# Patient Record
Sex: FEMALE | Race: BLACK OR AFRICAN AMERICAN | NOT HISPANIC OR LATINO | URBAN - METROPOLITAN AREA
[De-identification: names, ages, dates, MRNs, and addresses within clinical notes are randomized per-mention and may not be internally consistent; named-entity substitution may affect disease eponyms.]

---

## 2017-12-02 ENCOUNTER — EMERGENCY (EMERGENCY)
Facility: HOSPITAL | Age: 22
LOS: 1 days | Discharge: HOME | End: 2017-12-02
Attending: EMERGENCY MEDICINE | Admitting: EMERGENCY MEDICINE
Payer: COMMERCIAL

## 2017-12-02 VITALS
HEART RATE: 89 BPM | SYSTOLIC BLOOD PRESSURE: 116 MMHG | DIASTOLIC BLOOD PRESSURE: 78 MMHG | OXYGEN SATURATION: 99 % | RESPIRATION RATE: 18 BRPM | TEMPERATURE: 98 F

## 2017-12-02 VITALS
RESPIRATION RATE: 18 BRPM | SYSTOLIC BLOOD PRESSURE: 108 MMHG | DIASTOLIC BLOOD PRESSURE: 78 MMHG | WEIGHT: 104.94 LBS | OXYGEN SATURATION: 97 % | HEART RATE: 128 BPM | TEMPERATURE: 99 F

## 2017-12-02 LAB
ALBUMIN SERPL ELPH-MCNC: 4.1 G/DL — SIGNIFICANT CHANGE UP (ref 3.3–5)
ALP SERPL-CCNC: 37 U/L — LOW (ref 40–120)
ALT FLD-CCNC: 9 U/L — LOW (ref 10–45)
ANION GAP SERPL CALC-SCNC: 15 MMOL/L — SIGNIFICANT CHANGE UP (ref 5–17)
APPEARANCE UR: (no result)
AST SERPL-CCNC: 16 U/L — SIGNIFICANT CHANGE UP (ref 10–40)
BASOPHILS NFR BLD AUTO: 0.4 % — SIGNIFICANT CHANGE UP (ref 0–2)
BILIRUB SERPL-MCNC: 0.5 MG/DL — SIGNIFICANT CHANGE UP (ref 0.2–1.2)
BILIRUB UR-MCNC: NEGATIVE — SIGNIFICANT CHANGE UP
BUN SERPL-MCNC: 7 MG/DL — SIGNIFICANT CHANGE UP (ref 7–23)
CALCIUM SERPL-MCNC: 11.4 MG/DL — HIGH (ref 8.4–10.5)
CHLORIDE SERPL-SCNC: 97 MMOL/L — SIGNIFICANT CHANGE UP (ref 96–108)
CO2 SERPL-SCNC: 22 MMOL/L — SIGNIFICANT CHANGE UP (ref 22–31)
COLOR SPEC: YELLOW — SIGNIFICANT CHANGE UP
CREAT SERPL-MCNC: 0.65 MG/DL — SIGNIFICANT CHANGE UP (ref 0.5–1.3)
DIFF PNL FLD: (no result)
EOSINOPHIL NFR BLD AUTO: 1.3 % — SIGNIFICANT CHANGE UP (ref 0–6)
GLUCOSE SERPL-MCNC: 81 MG/DL — SIGNIFICANT CHANGE UP (ref 70–99)
GLUCOSE UR QL: NEGATIVE — SIGNIFICANT CHANGE UP
HCG SERPL-ACNC: HIGH MIU/ML
HCT VFR BLD CALC: 43 % — SIGNIFICANT CHANGE UP (ref 34.5–45)
HGB BLD-MCNC: 14.7 G/DL — SIGNIFICANT CHANGE UP (ref 11.5–15.5)
KETONES UR-MCNC: 40 MG/DL
LEUKOCYTE ESTERASE UR-ACNC: NEGATIVE — SIGNIFICANT CHANGE UP
LYMPHOCYTES # BLD AUTO: 23.6 % — SIGNIFICANT CHANGE UP (ref 13–44)
MCHC RBC-ENTMCNC: 29.5 PG — SIGNIFICANT CHANGE UP (ref 27–34)
MCHC RBC-ENTMCNC: 34.2 G/DL — SIGNIFICANT CHANGE UP (ref 32–36)
MCV RBC AUTO: 86.2 FL — SIGNIFICANT CHANGE UP (ref 80–100)
MONOCYTES NFR BLD AUTO: 9.1 % — SIGNIFICANT CHANGE UP (ref 2–14)
NEUTROPHILS NFR BLD AUTO: 65.6 % — SIGNIFICANT CHANGE UP (ref 43–77)
NITRITE UR-MCNC: NEGATIVE — SIGNIFICANT CHANGE UP
PH UR: 6.5 — SIGNIFICANT CHANGE UP (ref 5–8)
PLATELET # BLD AUTO: 203 K/UL — SIGNIFICANT CHANGE UP (ref 150–400)
POTASSIUM SERPL-MCNC: 3.8 MMOL/L — SIGNIFICANT CHANGE UP (ref 3.5–5.3)
POTASSIUM SERPL-SCNC: 3.8 MMOL/L — SIGNIFICANT CHANGE UP (ref 3.5–5.3)
PROT SERPL-MCNC: 7.4 G/DL — SIGNIFICANT CHANGE UP (ref 6–8.3)
PROT UR-MCNC: NEGATIVE MG/DL — SIGNIFICANT CHANGE UP
RBC # BLD: 4.99 M/UL — SIGNIFICANT CHANGE UP (ref 3.8–5.2)
RBC # FLD: 12.9 % — SIGNIFICANT CHANGE UP (ref 10.3–16.9)
RH IG SCN BLD-IMP: NEGATIVE — SIGNIFICANT CHANGE UP
SODIUM SERPL-SCNC: 134 MMOL/L — LOW (ref 135–145)
SP GR SPEC: 1.02 — SIGNIFICANT CHANGE UP (ref 1–1.03)
UROBILINOGEN FLD QL: 0.2 E.U./DL — SIGNIFICANT CHANGE UP
WBC # BLD: 5.6 K/UL — SIGNIFICANT CHANGE UP (ref 3.8–10.5)
WBC # FLD AUTO: 5.6 K/UL — SIGNIFICANT CHANGE UP (ref 3.8–10.5)

## 2017-12-02 PROCEDURE — 76830 TRANSVAGINAL US NON-OB: CPT

## 2017-12-02 PROCEDURE — 96372 THER/PROPH/DIAG INJ SC/IM: CPT

## 2017-12-02 PROCEDURE — 36415 COLL VENOUS BLD VENIPUNCTURE: CPT

## 2017-12-02 PROCEDURE — 87086 URINE CULTURE/COLONY COUNT: CPT

## 2017-12-02 PROCEDURE — 81001 URINALYSIS AUTO W/SCOPE: CPT

## 2017-12-02 PROCEDURE — 80053 COMPREHEN METABOLIC PANEL: CPT

## 2017-12-02 PROCEDURE — 86901 BLOOD TYPING SEROLOGIC RH(D): CPT

## 2017-12-02 PROCEDURE — 99285 EMERGENCY DEPT VISIT HI MDM: CPT

## 2017-12-02 PROCEDURE — 85025 COMPLETE CBC W/AUTO DIFF WBC: CPT

## 2017-12-02 PROCEDURE — 76801 OB US < 14 WKS SINGLE FETUS: CPT

## 2017-12-02 PROCEDURE — 86900 BLOOD TYPING SEROLOGIC ABO: CPT

## 2017-12-02 PROCEDURE — 99284 EMERGENCY DEPT VISIT MOD MDM: CPT | Mod: 25

## 2017-12-02 PROCEDURE — 86850 RBC ANTIBODY SCREEN: CPT

## 2017-12-02 PROCEDURE — 76817 TRANSVAGINAL US OBSTETRIC: CPT | Mod: 26

## 2017-12-02 PROCEDURE — 76815 OB US LIMITED FETUS(S): CPT | Mod: 26

## 2017-12-02 PROCEDURE — 84702 CHORIONIC GONADOTROPIN TEST: CPT

## 2017-12-02 RX ORDER — METOCLOPRAMIDE HCL 10 MG
1 TABLET ORAL
Qty: 42 | Refills: 0
Start: 2017-12-02 | End: 2017-12-16

## 2017-12-02 RX ORDER — RHO(D) IMMUNE GLOBULIN INTRAVENOUS (HUMAN) 2500 [IU]/1
300 INJECTION INTRAMUSCULAR; INTRAVENOUS ONCE
Qty: 0 | Refills: 0 | Status: DISCONTINUED | OUTPATIENT
Start: 2017-12-02 | End: 2017-12-02

## 2017-12-02 NOTE — ED ADULT NURSE NOTE - OBJECTIVE STATEMENT
Pt w no PMH and not on any daily meds who is  BIBA to ED with c/o vag spotting associated w abdom cramping, she denies any clots. Pt w no PMH and not on any daily meds who is  BIBA to ED with c/o vag spotting since Wednesday associated w abdom cramping, she denies any clots.

## 2017-12-02 NOTE — ED PROVIDER NOTE - CONDUCTED A DETAILED DISCUSSION WITH PATIENT AND/OR GUARDIAN REGARDING, MDM
lab results return to ED if symptoms worsen, persist or questions arise/lab results/radiology results/need for outpatient follow-up

## 2017-12-02 NOTE — ED PROVIDER NOTE - OBJECTIVE STATEMENT
21 y/o f with h/o 12 weeks in gestation presents to ED c/o vaginal bleeding since wednesday worsen yesterday. She state of test positive for pregnancy at 4 weeks has not had a sonogram to confirm IUP. Report of some dysuria yesterday, frequency  and continue to see blood whenever she urinated. + cramping. Denies fever, n,v, sob, chest pain, flank pain. LMP 17 ,

## 2017-12-02 NOTE — ED PROVIDER NOTE - ATTENDING CONTRIBUTION TO CARE
23 yo female  currently 12 weeks in gestation w/o prior u/s c/o 3 d intermittent vaginal bleeding and pelvic cramping w/o cp, palpitations, sob, n/v/d.   + mild dysuria.  LMP 17.  Well appearing, nad, nc/at, lung cta, heart reg, abd soft/nt, pelvic per pa exam, ext no c/c/e, no gross neuro deficits.  Doubt ectopic since 12 wk but ? threatened ab.  Plan labs, u/s, ua, reassess.  Likely dc to [facility] ob

## 2017-12-02 NOTE — ED PROVIDER NOTE - MEDICAL DECISION MAKING DETAILS
Patient with + IUP with vaginal bleeding. UA neg , RH- tx with rhogam. Sonogram + FH in the 160's. Recommend f/u with OB and pelvic rest.

## 2017-12-03 LAB
CULTURE RESULTS: SIGNIFICANT CHANGE UP
SPECIMEN SOURCE: SIGNIFICANT CHANGE UP

## 2017-12-06 DIAGNOSIS — O20.9 HEMORRHAGE IN EARLY PREGNANCY, UNSPECIFIED: ICD-10-CM

## 2017-12-06 DIAGNOSIS — Z3A.12 12 WEEKS GESTATION OF PREGNANCY: ICD-10-CM

## 2019-07-14 ENCOUNTER — EMERGENCY (EMERGENCY)
Facility: HOSPITAL | Age: 24
LOS: 1 days | Discharge: HOME | End: 2019-07-14
Attending: EMERGENCY MEDICINE
Payer: SELF-PAY

## 2019-07-14 VITALS
TEMPERATURE: 99 F | HEIGHT: 62 IN | DIASTOLIC BLOOD PRESSURE: 79 MMHG | OXYGEN SATURATION: 98 % | RESPIRATION RATE: 17 BRPM | SYSTOLIC BLOOD PRESSURE: 115 MMHG | HEART RATE: 86 BPM | WEIGHT: 132.06 LBS

## 2019-07-14 VITALS
OXYGEN SATURATION: 98 % | RESPIRATION RATE: 17 BRPM | SYSTOLIC BLOOD PRESSURE: 110 MMHG | DIASTOLIC BLOOD PRESSURE: 88 MMHG | TEMPERATURE: 98 F | HEART RATE: 70 BPM

## 2019-07-14 LAB
ALBUMIN SERPL ELPH-MCNC: 4.1 G/DL — SIGNIFICANT CHANGE UP (ref 3.5–5)
ALP SERPL-CCNC: 72 U/L — SIGNIFICANT CHANGE UP (ref 40–120)
ALT FLD-CCNC: 17 U/L DA — SIGNIFICANT CHANGE UP (ref 10–60)
ANION GAP SERPL CALC-SCNC: 7 MMOL/L — SIGNIFICANT CHANGE UP (ref 5–17)
AST SERPL-CCNC: 17 U/L — SIGNIFICANT CHANGE UP (ref 10–40)
BASOPHILS # BLD AUTO: 0.02 K/UL — SIGNIFICANT CHANGE UP (ref 0–0.2)
BASOPHILS NFR BLD AUTO: 0.4 % — SIGNIFICANT CHANGE UP (ref 0–2)
BILIRUB SERPL-MCNC: 0.8 MG/DL — SIGNIFICANT CHANGE UP (ref 0.2–1.2)
BLD GP AB SCN SERPL QL: SIGNIFICANT CHANGE UP
BUN SERPL-MCNC: 10 MG/DL — SIGNIFICANT CHANGE UP (ref 7–18)
CALCIUM SERPL-MCNC: 8.7 MG/DL — SIGNIFICANT CHANGE UP (ref 8.4–10.5)
CHLORIDE SERPL-SCNC: 105 MMOL/L — SIGNIFICANT CHANGE UP (ref 96–108)
CO2 SERPL-SCNC: 25 MMOL/L — SIGNIFICANT CHANGE UP (ref 22–31)
CREAT SERPL-MCNC: 0.82 MG/DL — SIGNIFICANT CHANGE UP (ref 0.5–1.3)
EOSINOPHIL # BLD AUTO: 0.1 K/UL — SIGNIFICANT CHANGE UP (ref 0–0.5)
EOSINOPHIL NFR BLD AUTO: 2.1 % — SIGNIFICANT CHANGE UP (ref 0–6)
GLUCOSE SERPL-MCNC: 77 MG/DL — SIGNIFICANT CHANGE UP (ref 70–99)
HCT VFR BLD CALC: 43.3 % — SIGNIFICANT CHANGE UP (ref 34.5–45)
HGB BLD-MCNC: 14.2 G/DL — SIGNIFICANT CHANGE UP (ref 11.5–15.5)
IMM GRANULOCYTES NFR BLD AUTO: 0.4 % — SIGNIFICANT CHANGE UP (ref 0–1.5)
LYMPHOCYTES # BLD AUTO: 1.13 K/UL — SIGNIFICANT CHANGE UP (ref 1–3.3)
LYMPHOCYTES # BLD AUTO: 23.6 % — SIGNIFICANT CHANGE UP (ref 13–44)
MCHC RBC-ENTMCNC: 29.8 PG — SIGNIFICANT CHANGE UP (ref 27–34)
MCHC RBC-ENTMCNC: 32.8 GM/DL — SIGNIFICANT CHANGE UP (ref 32–36)
MCV RBC AUTO: 90.8 FL — SIGNIFICANT CHANGE UP (ref 80–100)
MONOCYTES # BLD AUTO: 0.57 K/UL — SIGNIFICANT CHANGE UP (ref 0–0.9)
MONOCYTES NFR BLD AUTO: 11.9 % — SIGNIFICANT CHANGE UP (ref 2–14)
NEUTROPHILS # BLD AUTO: 2.95 K/UL — SIGNIFICANT CHANGE UP (ref 1.8–7.4)
NEUTROPHILS NFR BLD AUTO: 61.6 % — SIGNIFICANT CHANGE UP (ref 43–77)
NRBC # BLD: 0 /100 WBCS — SIGNIFICANT CHANGE UP (ref 0–0)
PLATELET # BLD AUTO: 218 K/UL — SIGNIFICANT CHANGE UP (ref 150–400)
POTASSIUM SERPL-MCNC: 4.4 MMOL/L — SIGNIFICANT CHANGE UP (ref 3.5–5.3)
POTASSIUM SERPL-SCNC: 4.4 MMOL/L — SIGNIFICANT CHANGE UP (ref 3.5–5.3)
PROT SERPL-MCNC: 7.4 G/DL — SIGNIFICANT CHANGE UP (ref 6–8.3)
RBC # BLD: 4.77 M/UL — SIGNIFICANT CHANGE UP (ref 3.8–5.2)
RBC # FLD: 12.8 % — SIGNIFICANT CHANGE UP (ref 10.3–14.5)
SODIUM SERPL-SCNC: 137 MMOL/L — SIGNIFICANT CHANGE UP (ref 135–145)
WBC # BLD: 4.79 K/UL — SIGNIFICANT CHANGE UP (ref 3.8–10.5)
WBC # FLD AUTO: 4.79 K/UL — SIGNIFICANT CHANGE UP (ref 3.8–10.5)

## 2019-07-14 PROCEDURE — 99285 EMERGENCY DEPT VISIT HI MDM: CPT

## 2019-07-14 PROCEDURE — 86901 BLOOD TYPING SEROLOGIC RH(D): CPT

## 2019-07-14 PROCEDURE — 80053 COMPREHEN METABOLIC PANEL: CPT

## 2019-07-14 PROCEDURE — 76817 TRANSVAGINAL US OBSTETRIC: CPT

## 2019-07-14 PROCEDURE — 96372 THER/PROPH/DIAG INJ SC/IM: CPT

## 2019-07-14 PROCEDURE — 99284 EMERGENCY DEPT VISIT MOD MDM: CPT | Mod: 25

## 2019-07-14 PROCEDURE — 76817 TRANSVAGINAL US OBSTETRIC: CPT | Mod: 26

## 2019-07-14 PROCEDURE — 86900 BLOOD TYPING SEROLOGIC ABO: CPT

## 2019-07-14 PROCEDURE — 84702 CHORIONIC GONADOTROPIN TEST: CPT

## 2019-07-14 PROCEDURE — 36415 COLL VENOUS BLD VENIPUNCTURE: CPT

## 2019-07-14 PROCEDURE — 76801 OB US < 14 WKS SINGLE FETUS: CPT

## 2019-07-14 PROCEDURE — 86850 RBC ANTIBODY SCREEN: CPT

## 2019-07-14 PROCEDURE — 85027 COMPLETE CBC AUTOMATED: CPT

## 2019-07-14 PROCEDURE — 76801 OB US < 14 WKS SINGLE FETUS: CPT | Mod: 26

## 2019-07-14 RX ORDER — METHOTREXATE 2.5 MG/1
80 TABLET ORAL ONCE
Refills: 0 | Status: COMPLETED | OUTPATIENT
Start: 2019-07-14 | End: 2019-07-14

## 2019-07-14 RX ORDER — ACETAMINOPHEN WITH CODEINE 300MG-30MG
1 TABLET ORAL
Qty: 10 | Refills: 0
Start: 2019-07-14

## 2019-07-14 RX ORDER — ACETAMINOPHEN 500 MG
650 TABLET ORAL ONCE
Refills: 0 | Status: COMPLETED | OUTPATIENT
Start: 2019-07-14 | End: 2019-07-14

## 2019-07-14 RX ADMIN — Medication 650 MILLIGRAM(S): at 11:18

## 2019-07-14 RX ADMIN — Medication 650 MILLIGRAM(S): at 12:18

## 2019-07-14 RX ADMIN — METHOTREXATE 80 MILLIGRAM(S): 2.5 TABLET ORAL at 14:48

## 2019-07-14 NOTE — ED ADULT NURSE REASSESSMENT NOTE - NS ED NURSE REASSESS COMMENT FT1
Patient in no acute distress, no signs or symptoms of reaction to methotrexate . Patient tolerated injection well. No acute onset of symptoms verbalized.

## 2019-07-14 NOTE — CHART NOTE - NSCHARTNOTEFT_GEN_A_CORE
[facility] FOLLOW UP NOTE    Pt seen at [facility]. Pt explained that Methotrexate therapy is recommended given clinical findings and [facility] 594. Pt explained risks and benefits of methotrexate including failing therapy course and side effects of medication. Pt verbalized understanding and states she wishes to proceed with treatment. Pt given educational information on Methotrexate and informed consent was signed. Will order methotrexate injection at this time.   Pt to return to ER for repeat blood testing on Wednesday 7/17 for day 4 of methotrexate and later day 7   Return to ER for any acute compaints including abdominal pain, vaginal bleeding, dizziness or any other acute symptoms   case d/w Dr. Cat

## 2019-07-14 NOTE — ED PROVIDER NOTE - CLINICAL SUMMARY MEDICAL DECISION MAKING FREE TEXT BOX
Patient is 24y/o F who took positive home pregnancy test and is complaining of right side abdominal pain and vaginal bleeding. Concerned for ectopic pregnancy. Will do labs, sonogram and reassess.

## 2019-07-14 NOTE — ED ADULT TRIAGE NOTE - CHIEF COMPLAINT QUOTE
Rt abd pain radiating to Rt leg since yesterday, denies nausea or vomiting  c/o vaginal bleeding with clots, Home pregnancy test positive

## 2019-07-14 NOTE — ED PROVIDER NOTE - PROGRESS NOTE DETAILS
Pt seen by GYN team, ordered for methotrexate. To have repeat test on Wednesday. Pt educated on care and f/u.

## 2019-07-14 NOTE — ED ADULT NURSE NOTE - OBJECTIVE STATEMENT
Pt c/o Right abd pain radiating to Rt leg since yesterday, denies nausea or vomiting. Pt c/o vaginal bleeding with clots, Home pregnancy test positive since last week pt states. No acute distress noted, denies chest pain, no shortness of breath indicated. Safety maintained.

## 2019-07-14 NOTE — CONSULT NOTE ADULT - SUBJECTIVE AND OBJECTIVE BOX
23 year old  with unknown LMP presents to ED with complaint of RLQ since yesterday morning. Pt states she started having the pain since when she woke up yesterday morning and it has been a constant pain since then. Pt was given Tylenol in ED with some improvement. Pt states she had a positive urine pregnancy test last week although she has not had an OBGYN visit with this pregnancy. Pt denies vaginal ble 23 year old  with unknown LMP presents to ED with complaint of RLQ since yesterday morning. Pt states she started having the pain since when she woke up yesterday morning and it has been a constant pain since then. Pt was given Tylenol in ED with some improvement. Pt states she had a positive urine pregnancy test last week although she has not had an OBGYN visit with this pregnancy. Pt denies any vaginal bleeding, chest pain, sob, dizziness, palpitations, n/v/d, dysuria or frequency.     OBGYN: does not have   Pt states she had a positive urine pregnancy test last week although she has not had an OBGYN visit with this pregnancy.  Pt has history of irregular menses and has not had a period since getting off birth control in february   Hx of CS x 2, TOP w/ d&c x 2  denies ovarian cysts, denies fibroids, stds last pap wnl   Allergies: NKA  Meds: none   PMHx: denies   PSHx: CS x 2, d&c x 2   PsocHx: denies x 3     Vital Signs Last 24 Hrs  T(C): 37.1 (2019 08:20), Max: 37.1 (2019 08:20)  T(F): 98.7 (2019 08:20), Max: 98.7 (2019 08:20)  HR: 86 (2019 08:20) (86 - 86)  BP: 115/79 (2019 08:20) (115/79 - 115/79)  BP(mean): --  RR: 17 (2019 08:20) (17 - 17)  SpO2: 98% (2019 08:20) (98% - 98%)    PHYSICAL EXAM:    Gen: A&O x 3, NAD  Chest: CTA B/L  Cardiac: S1,S2  RRR  Abdomen: +BS; soft; Nontender, nondistended, no rebound or guarding   Gyn deferred   Extremities: Nontender, no edema    examined by Dr. Cat     LABS:                        14.2   4.79  )-----------( 218      ( 2019 09:18 )             43.3     07-14    137  |  105  |  10  ----------------------------<  77  4.4   |  25  |  0.82    Ca    8.7      2019 09:18    TPro  7.4  /  Alb  4.1  /  TBili  0.8  /  DBili  x   /  AST  17  /  ALT  17  /  AlkPhos  72  07-14    [facility] 594      RADIOLOGY & ADDITIONAL STUDIES:    < from: US Transvaginal, OB (19 @ 10:27) >  INTERPRETATION:  Transabdominal and transvaginal obstetrical ultrasound    Indication: Pregnantwoman with vaginal bleeding. Right pelvic pain.     Transabdominal and transvaginal obstetrical ultrasounds are performed and   they are reported together. No prior examination is available for   comparison. The uterus is anteverted at 11.4 x 4.4 x 5.4 cm. No evidence   for an intrauterine gestational sac. The endometrial stripe measures 1.0   cm in thickness which is within normal limits. No evidence for a uterine   fibroid. The cervix is closed.    The right ovary measures 2.8 x 2.0 x 2.7 cm and the left ovary measures   2.2 x 1.3 x 2.1 cm. Both ovaries appear unremarkable. Duplex Doppler flow   is demonstrated for both ovaries.    In the right adnexal region adjacent to the right ovary, there is a 2.3 x   2.2 x 1.7 cm mildly echogenic masswith a central small cavitary   component. This is suspicious for an ectopic pregnancy.    Mild pelvic free fluid in the cul-de-sac, likely physiologic for age.    Impression: No intrauterine gestational sac is seen. 2.3 cm mildly   hyperechoic mass in the right adnexal region with a central cavitary   component, suspicious for an ectopic pregnancy.    Dr. Sanders is informed.    < end of copied text >

## 2019-07-14 NOTE — CONSULT NOTE ADULT - ASSESSMENT
23 year old   with likely right ectopic pregnancy   -pt explained in depth with Dr. Cat the diagnosis of ectopic pregnancy. Pt explained that she has options for surgical management vs methotrexate. Pt explained that Methotrexate therapy is recommended given clinical findings and [facility] of 594. Pt explained risks and benefits of methotrexate including failing therapy course and side effects of medication. Pt verbalized understanding and states she wishes to speak to her family before proceeding with treatment   -will follow up with patient  -Pt seen with Dr. Cat

## 2019-07-14 NOTE — CHART NOTE - NSCHARTNOTEFT_GEN_A_CORE
I have explained the treatment options of ectopic pregnancy to the patient, including surgery and MTX administration. Risks and benefits of each.  Patient showed understanding. all questions answered and signed informed consent. Patient chooses MTX therapy.    Plan Methotrexate:  -Recommend methotrexate therapy for treatment of ectopic pregnancy.  No contraindications.  -Patient counseled on methotrexate therapy as treatment for ectopic pregnancy.  Common side effects of the medication as well as restrictions while on the medication were reviewed with patient and patient signed methotrexate information sheet that was placed in her chart.    -Reviewed with patient the 15-20% methotrexate failure rate and possibility of an additional dose of methotrexate.   -Consents for methotrexate signed with patient at bedside.    -Chemotherapy drug order form sent  to pharmacy with methotrexate dose calculated based on BSA for patient.   -Patient given strict precautions to call her physician or return to ED if she experiences any severe abdominal pain, dizziness, lightheadedness, severe n/v, or any heavy vaginal bleeding >2 pads/hour for >2 hours.    -Patient instructed on need for follow up of b-hcg on day 4 and day 7 of methotrexate therapy.  Patient intends to follow up in the ED .    -Once patient receives methotrexate therapy she is cleared for discharge from OBGYN perspective.  Primary management per ED team.

## 2019-07-17 ENCOUNTER — EMERGENCY (EMERGENCY)
Facility: HOSPITAL | Age: 24
LOS: 1 days | Discharge: HOME | End: 2019-07-17
Attending: EMERGENCY MEDICINE
Payer: SELF-PAY

## 2019-07-17 VITALS
HEART RATE: 89 BPM | SYSTOLIC BLOOD PRESSURE: 123 MMHG | TEMPERATURE: 98 F | RESPIRATION RATE: 16 BRPM | WEIGHT: 130.07 LBS | DIASTOLIC BLOOD PRESSURE: 78 MMHG | OXYGEN SATURATION: 99 % | HEIGHT: 62 IN

## 2019-07-17 LAB — HCG SERPL-ACNC: 190 MIU/ML — HIGH

## 2019-07-17 PROCEDURE — 99283 EMERGENCY DEPT VISIT LOW MDM: CPT

## 2019-07-17 PROCEDURE — 36415 COLL VENOUS BLD VENIPUNCTURE: CPT

## 2019-07-17 PROCEDURE — 84702 CHORIONIC GONADOTROPIN TEST: CPT

## 2019-07-17 RX ORDER — IBUPROFEN 200 MG
800 TABLET ORAL ONCE
Refills: 0 | Status: COMPLETED | OUTPATIENT
Start: 2019-07-17 | End: 2019-07-17

## 2019-07-17 RX ADMIN — Medication 800 MILLIGRAM(S): at 12:45

## 2019-07-17 NOTE — ED PROVIDER NOTE - CLINICAL SUMMARY MEDICAL DECISION MAKING FREE TEXT BOX
23 yo female was seen in the ED for repeat hCG after receiving methotrexate on 7/14/19 for ectopic pregnancy.     repeat HCG  dc home with ectopic return precautions

## 2019-07-17 NOTE — ED PROVIDER NOTE - PROGRESS NOTE DETAILS
spoke with GYN PA over the phone. as per PA if HCG is decreasing pt does not need more methotrexate today but must return on 7/21/19 for the 3rd repeat HCG. PA states that mild abd pain and vaginal bleeding is normal after methotrexate, pt can be dc home if she has no fevers, N/V? severe abd pain. Explained to pt that she must return for repeat HCG on 7/21/19 and gave ectopic pregnancy return precautions.

## 2019-07-17 NOTE — ED ADULT NURSE NOTE - NSIMPLEMENTINTERV_GEN_ALL_ED
Implemented All Universal Safety Interventions:  [place] to call system. Call bell, personal items and telephone within reach. Instruct patient to call for assistance. Room bathroom lighting operational. Non-slip footwear when patient is off stretcher. Physically safe environment: no spills, clutter or unnecessary equipment. Stretcher in lowest position, wheels locked, appropriate side rails in place.

## 2019-07-17 NOTE — ED PROVIDER NOTE - OBJECTIVE STATEMENT
23 yo female with no pmh presents to the ED for repeat blood work. Pt was seen in the ED for lower right abdominal pain on 7/14/19, was diagnosed with right sided ectopic pregnancy, was seen by GYN and treated with methotrexate. Pt returns today as per GYN recommendations for repeat hCG. Pt states she is still having mild lower abdominal cramping and vaginal bleeding. She denies N/V/D, fevers, chest pain, dizziness, sob.

## 2023-01-11 NOTE — ED PROVIDER NOTE - SEVERITY
Pharmacy faxed in a request forCLARIFICATION on:    Medication: B-D #[id number] NEEDLES [id number]  Quantity requested:  50  Form Requested: no  ID Number:  RX# [id number]    Preferred pharmacy has been set up and verified.    NOTE: 18G, 21G, or 22G available not 20, please clairify    PAIN SCALE 4 OF 10.

## 2023-07-26 NOTE — ED PROVIDER NOTE - OBJECTIVE STATEMENT
24 y/o F patient with no significant PMHx and no significant PSHx presents to the ED for right side abdominal pain and vaginal bleeding. Patient took a positive home pregnancy test, . Patient used 3 pads yesterday and took Motrin. Patient denies any dizziness, fever, vomiting or any other complaints. NKDA. No